# Patient Record
(demographics unavailable — no encounter records)

---

## 2025-02-06 NOTE — END OF VISIT
[] : Fellow [FreeTextEntry3] : Patient's gait has progressively declined since her last visit. She needs to hold onto furniture more and is now using her rollator in her home. Had one fall when turn. RLS has been under control. Notes that her legs tremor considerably when she does not hold on to anythging when walking  On exam, there is no facial masking. EOMI. speech 1+. There is no tremors at rest. There is 1+ Solo in UL and 2+ leg lifts and foot taps. Rigidity 1+ Stands easily and has scoliotic posture. Has OM when stand that increases with ea step she attempts to take w/o holding examiner's hand. OM diminishes when she holds examiner's hand. Steps are short. Walks well but slowly with rollator  Assessment: lower body parkinsonism with OM  +/- cervical spondylosis contribution retry sinemet  IR formulation and titrate to 1 tab TID. would like to try low dose keppra or klonopin for OM but pt lives alone and I am concerned about these meds AEs in this scenario Refer for home PT Leave RLS regimen the same  f/u 3months   [Time Spent: ___ minutes] : I have spent [unfilled] minutes of time on the encounter which excludes teaching and separately reported services.

## 2025-02-06 NOTE — HISTORY OF PRESENT ILLNESS
[FreeTextEntry1] : Patient is an 87 year old RH female here for balance and gait issues and Restless leg syndrome.  Reports worsening of walking and balance, using walker. She had a fall a month ago when she turned too fast and lost her balance, injured her right shoulder. Didn't seek immediate medical attention. Reports occasional leg tremors.  restless leg not bothering much now.  Intermittent worsening of back pain on prolonged standing, no radiation.   non motor denies constipation sleep is okay ; denies any RBD symptoms mood is okay memory is okay, lives alone and manages everything for herself sense of smell is good   Current meds: Mirapex 1mg BID Gabapentin 100 mg at 6pm, and at bedtime  Prior med: Sinemet: no improvement but worsening RLS symptom Zonisamide: dizziness

## 2025-02-06 NOTE — PHYSICAL EXAM
[FreeTextEntry1] : slightly reduced facial expression EOMI no facial asymmetry no tremor today subtle bradykinesia on rt side, ROM restricted due to pain no clear rigidity noted   gait able to stand up without pushing from sitting position. 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

## 2025-02-06 NOTE — DISCUSSION/SUMMARY
[FreeTextEntry1] : Patient s an 87 year old RH female with RLS and gait instability. 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 worsening gait for past few months.  Following recommendations are made: - will give another trial of Sinemet IR, titrate upto 1tab TID - couldn't tolerate Zonisamide, may consider Keppra/Clonazepam if L-dopa doesn't help. - Restart PT -  Encouraged to increase exercise and physical activity  follow up in 2 months  Patient is seen and discussed with Dr. Laura CLAROS Movement Disorder Fellow

## 2025-04-24 NOTE — PHYSICAL EXAM
[FreeTextEntry1] : slightly reduced facial expression EOMI no facial asymmetry no tremor today subtle bradykinesia on rt side, ROM restricted due to shoulder pain no clear rigidity noted   gait able to stand up without pushing from sitting position. 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 Mild in severity and transfers to examiner's hand  narrow gait with shortened stride length fall backwards when stands unassisted walks better with walker

## 2025-04-24 NOTE — HISTORY OF PRESENT ILLNESS
[FreeTextEntry1] : Patient is an 88 year old RH female here for balance and gait issues and Restless leg syndrome. Couldn't tolerate Sineemt due to headache, didn't want to try Amantadine due to AE. Reports persistent difficulty with walking and balance, using walker. She had a fall 2 weeks ago when she turned too fast and lost her balance,  restless leg not bothering much now.  Intermittent worsening of back pain on prolonged standing, no radiation.   non motor denies constipation sleep is okay ; denies any RBD symptoms mood is okay memory is okay, lives alone and manages everything for herself sense of smell is good   Current meds: Mirapex 0.75 mg BID Gabapentin 100 mg BID  Prior med: Sinemet: caused headache, no improvement but worsening RLS symptom Zonisamide: dizziness

## 2025-04-24 NOTE — DISCUSSION/SUMMARY
[FreeTextEntry1] : Patient s an 88 year old RH female with RLS and gait instability. 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 worsening gait for past few months. retrial of L-dopa couldn't tolerate Following recommendations are made: - Can give trial of low dose Keppra 250 mg daily for 2 weeks, then increase it to 250 mg BID. discussed adverse effects - continue Gabapentin and Mirapex for RLS - PT referral -  Encouraged to increase exercise and physical activity and maintain an active social and intellectual life,    Patient is seen and discussed with Dr. Laura CLAROS Movement Disorder Fellow